# Patient Record
Sex: FEMALE | Race: WHITE | Employment: OTHER | URBAN - METROPOLITAN AREA
[De-identification: names, ages, dates, MRNs, and addresses within clinical notes are randomized per-mention and may not be internally consistent; named-entity substitution may affect disease eponyms.]

---

## 2017-01-20 ENCOUNTER — TELEPHONE (OUTPATIENT)
Dept: PULMONOLOGY | Facility: CLINIC | Age: 77
End: 2017-01-20

## 2017-01-20 RX ORDER — AZITHROMYCIN 250 MG/1
TABLET, FILM COATED ORAL
Qty: 6 TABLET | Refills: 0 | Status: SHIPPED | OUTPATIENT
Start: 2017-01-20 | End: 2017-09-29

## 2017-01-20 NOTE — TELEPHONE ENCOUNTER
Pt c/o nonproductive cough. She denies any other symptoms. Pt stts she would like to have a Z-Miguel on hand just in case. Explained I will d/w AR this afternoon & f/u. She verbalized understanding.

## 2017-01-20 NOTE — TELEPHONE ENCOUNTER
V/o received from Dr. Jeison Howard for [address]. Rx sent to pharmacy. Notified pt of Dr. Los Kulkarni orders. She verbalized understanding.

## 2017-01-23 RX ORDER — IPRATROPIUM BROMIDE AND ALBUTEROL SULFATE 2.5; .5 MG/3ML; MG/3ML
SOLUTION RESPIRATORY (INHALATION)
Qty: 360 ML | Refills: 3 | Status: SHIPPED | OUTPATIENT
Start: 2017-01-23 | End: 2017-09-29

## 2017-09-29 ENCOUNTER — OFFICE VISIT (OUTPATIENT)
Dept: PULMONOLOGY | Facility: CLINIC | Age: 77
End: 2017-09-29

## 2017-09-29 VITALS
SYSTOLIC BLOOD PRESSURE: 125 MMHG | OXYGEN SATURATION: 90 % | HEIGHT: 60 IN | HEART RATE: 81 BPM | DIASTOLIC BLOOD PRESSURE: 75 MMHG | WEIGHT: 211.38 LBS | BODY MASS INDEX: 41.5 KG/M2 | RESPIRATION RATE: 18 BRPM

## 2017-09-29 DIAGNOSIS — J96.11 CHRONIC RESPIRATORY FAILURE WITH HYPOXIA (HCC): ICD-10-CM

## 2017-09-29 DIAGNOSIS — J43.9 PULMONARY EMPHYSEMA, UNSPECIFIED EMPHYSEMA TYPE (HCC): Primary | ICD-10-CM

## 2017-09-29 PROCEDURE — G0008 ADMIN INFLUENZA VIRUS VAC: HCPCS | Performed by: INTERNAL MEDICINE

## 2017-09-29 PROCEDURE — 90653 IIV ADJUVANT VACCINE IM: CPT | Performed by: INTERNAL MEDICINE

## 2017-09-29 PROCEDURE — G0463 HOSPITAL OUTPT CLINIC VISIT: HCPCS | Performed by: INTERNAL MEDICINE

## 2017-09-29 PROCEDURE — 99214 OFFICE O/P EST MOD 30 MIN: CPT | Performed by: INTERNAL MEDICINE

## 2017-09-29 RX ORDER — AZITHROMYCIN 250 MG/1
TABLET, FILM COATED ORAL
Qty: 6 TABLET | Refills: 0 | Status: SHIPPED | OUTPATIENT
Start: 2017-09-29 | End: 2019-09-13

## 2017-09-29 RX ORDER — ALBUTEROL SULFATE 90 UG/1
2 AEROSOL, METERED RESPIRATORY (INHALATION) EVERY 6 HOURS PRN
Qty: 1 INHALER | Refills: 5 | Status: SHIPPED | OUTPATIENT
Start: 2017-09-29 | End: 2018-06-22

## 2017-09-29 RX ORDER — IPRATROPIUM BROMIDE AND ALBUTEROL SULFATE 2.5; .5 MG/3ML; MG/3ML
SOLUTION RESPIRATORY (INHALATION)
Qty: 360 ML | Refills: 3 | Status: SHIPPED | OUTPATIENT
Start: 2017-09-29 | End: 2017-10-02

## 2017-09-29 NOTE — PROGRESS NOTES
HPI:    Patient ID: Angelika Lewis is a 78year old female.     HPI  About baseline   Still smoking few cigarette /day   Was distressed last 2 months after her sister's death   Dyspnea on exertion   No cough now or sputum / no fever or chills and no c supple. No JVD present. Cardiovascular: Normal rate. Exam reveals no friction rub. No murmur heard.   Pulmonary/Chest:   Barrel shaped chest   Diminished bilaterally   Prolonged expiration   No wheezes or rales or rhonchi    Abdominal: Bowel sounds ar

## 2017-10-02 RX ORDER — IPRATROPIUM BROMIDE AND ALBUTEROL SULFATE 2.5; .5 MG/3ML; MG/3ML
SOLUTION RESPIRATORY (INHALATION)
Qty: 360 ML | Refills: 3 | Status: SHIPPED | OUTPATIENT
Start: 2017-10-02 | End: 2018-06-22

## 2017-10-02 NOTE — TELEPHONE ENCOUNTER
Pharmacy states RX needs diagnosis codes for Medicare:       Current Outpatient Prescriptions:   •  ipratropium-albuterol 0.5-2.5 (3) MG/3ML Inhalation Solution, USE 3 ML VIA NEBULIZER 4 TIMES A DAY AS NEEDED, Disp: 360 mL, Rfl: 3

## 2018-06-22 ENCOUNTER — TELEPHONE (OUTPATIENT)
Dept: PULMONOLOGY | Facility: CLINIC | Age: 78
End: 2018-06-22

## 2018-06-22 ENCOUNTER — OFFICE VISIT (OUTPATIENT)
Dept: PULMONOLOGY | Facility: CLINIC | Age: 78
End: 2018-06-22

## 2018-06-22 VITALS
HEIGHT: 60 IN | HEART RATE: 93 BPM | SYSTOLIC BLOOD PRESSURE: 148 MMHG | OXYGEN SATURATION: 90 % | WEIGHT: 209 LBS | RESPIRATION RATE: 18 BRPM | DIASTOLIC BLOOD PRESSURE: 83 MMHG | BODY MASS INDEX: 41.03 KG/M2

## 2018-06-22 DIAGNOSIS — J06.9 VIRAL UPPER RESPIRATORY TRACT INFECTION: ICD-10-CM

## 2018-06-22 DIAGNOSIS — J42 CHRONIC BRONCHITIS, UNSPECIFIED CHRONIC BRONCHITIS TYPE (HCC): Primary | ICD-10-CM

## 2018-06-22 PROCEDURE — 99214 OFFICE O/P EST MOD 30 MIN: CPT | Performed by: INTERNAL MEDICINE

## 2018-06-22 PROCEDURE — G0463 HOSPITAL OUTPT CLINIC VISIT: HCPCS | Performed by: INTERNAL MEDICINE

## 2018-06-22 RX ORDER — IPRATROPIUM BROMIDE AND ALBUTEROL SULFATE 2.5; .5 MG/3ML; MG/3ML
SOLUTION RESPIRATORY (INHALATION)
Qty: 360 ML | Refills: 3 | Status: SHIPPED | OUTPATIENT
Start: 2018-06-22 | End: 2019-09-24

## 2018-06-22 RX ORDER — AZITHROMYCIN 250 MG/1
TABLET, FILM COATED ORAL
Qty: 6 TABLET | Refills: 0 | Status: SHIPPED | OUTPATIENT
Start: 2018-06-22 | End: 2019-09-20 | Stop reason: ALTCHOICE

## 2018-06-22 RX ORDER — ALBUTEROL SULFATE 90 UG/1
2 AEROSOL, METERED RESPIRATORY (INHALATION) EVERY 6 HOURS PRN
Qty: 1 INHALER | Refills: 5 | Status: SHIPPED | OUTPATIENT
Start: 2018-06-22

## 2018-06-22 RX ORDER — PREDNISONE 10 MG/1
TABLET ORAL
Qty: 18 TABLET | Refills: 0 | Status: SHIPPED | OUTPATIENT
Start: 2018-06-22 | End: 2019-09-20 | Stop reason: ALTCHOICE

## 2018-06-22 NOTE — PROGRESS NOTES
HPI:    Patient ID: Yesenia Alvarado is a 78year old female. HPI  Hoarseness in her voice and URI symptoms / slight increase in cough   No f/c   No abd pain or n/v/d   No edema  Review of Systems   Constitutional: Positive for fatigue.  Negative for Musculoskeletal: She exhibits no edema or tenderness.               ASSESSMENT/PLAN:   Chronic bronchitis, unspecified chronic bronchitis type (hcc)  (primary encounter diagnosis)  Viral upper respiratory tract infection      1- severe copd    Mild acute

## 2018-06-22 NOTE — TELEPHONE ENCOUNTER
Pt seen in clinic today. Dr. Siri Bonner signed pt parking placard form. Per Pt request form has been mailed to [employer].

## 2019-01-01 ENCOUNTER — TELEPHONE (OUTPATIENT)
Dept: PULMONOLOGY | Facility: CLINIC | Age: 79
End: 2019-01-01

## 2019-09-13 ENCOUNTER — OFFICE VISIT (OUTPATIENT)
Dept: PULMONOLOGY | Facility: CLINIC | Age: 79
End: 2019-09-13
Payer: MEDICARE

## 2019-09-13 VITALS
TEMPERATURE: 99 F | WEIGHT: 205 LBS | HEART RATE: 93 BPM | HEIGHT: 60 IN | RESPIRATION RATE: 20 BRPM | SYSTOLIC BLOOD PRESSURE: 153 MMHG | OXYGEN SATURATION: 93 % | BODY MASS INDEX: 40.25 KG/M2 | DIASTOLIC BLOOD PRESSURE: 74 MMHG

## 2019-09-13 DIAGNOSIS — J44.9 CHRONIC OBSTRUCTIVE PULMONARY DISEASE, UNSPECIFIED COPD TYPE (HCC): Primary | ICD-10-CM

## 2019-09-13 DIAGNOSIS — J96.11 CHRONIC RESPIRATORY FAILURE WITH HYPOXIA (HCC): ICD-10-CM

## 2019-09-13 PROCEDURE — 99214 OFFICE O/P EST MOD 30 MIN: CPT | Performed by: INTERNAL MEDICINE

## 2019-09-13 PROCEDURE — G0463 HOSPITAL OUTPT CLINIC VISIT: HCPCS | Performed by: INTERNAL MEDICINE

## 2019-09-13 RX ORDER — AZITHROMYCIN 250 MG/1
TABLET, FILM COATED ORAL
Qty: 6 TABLET | Refills: 0 | Status: CANCELLED | OUTPATIENT
Start: 2019-09-13

## 2019-09-13 RX ORDER — AZITHROMYCIN 250 MG/1
TABLET, FILM COATED ORAL
Qty: 6 TABLET | Refills: 0 | Status: SHIPPED | OUTPATIENT
Start: 2019-09-13 | End: 2019-09-20 | Stop reason: ALTCHOICE

## 2019-09-13 NOTE — PROGRESS NOTES
HPI:    Patient ID: Dong Walker is a 70year old female.     HPI  More dyspnea on exertion / stressed out with her daughter diagnosed with cancer   No cough or sputum   No f/c   No cp   Stable weight   Review of Systems   Constitutional: Negative f to person, place, and time. She appears well-nourished. No distress. HENT:   Head: Atraumatic. Neck: Neck supple. No JVD present. Cardiovascular: Normal rate. Exam reveals no gallop. No murmur heard. Pulmonary/Chest: No stridor.  She has no wheezes

## 2019-09-20 PROBLEM — E66.01 MORBID OBESITY (HCC): Status: ACTIVE | Noted: 2019-09-20

## 2019-09-20 PROBLEM — I10 ESSENTIAL HYPERTENSION: Status: ACTIVE | Noted: 2019-09-20

## 2019-09-20 PROBLEM — R35.0 URINARY FREQUENCY: Status: ACTIVE | Noted: 2019-09-20

## 2019-09-20 PROBLEM — J43.9 PULMONARY EMPHYSEMA, UNSPECIFIED EMPHYSEMA TYPE (HCC): Status: ACTIVE | Noted: 2019-09-20

## 2019-09-20 PROCEDURE — 87086 URINE CULTURE/COLONY COUNT: CPT | Performed by: INTERNAL MEDICINE

## 2019-09-20 PROCEDURE — 81001 URINALYSIS AUTO W/SCOPE: CPT | Performed by: INTERNAL MEDICINE

## 2019-09-25 RX ORDER — IPRATROPIUM BROMIDE AND ALBUTEROL SULFATE 2.5; .5 MG/3ML; MG/3ML
SOLUTION RESPIRATORY (INHALATION)
Qty: 360 ML | Refills: 3 | Status: SHIPPED | OUTPATIENT
Start: 2019-09-25

## 2019-12-04 NOTE — TELEPHONE ENCOUNTER
Patient thinks Kandis is giving her UTI. Patient states she has had 3 since starting medication and has not cleared up. Please call. Thank you.

## 2019-12-04 NOTE — TELEPHONE ENCOUNTER
Pt wondering if Trelegy is causing UTI. Pt states she started Trelegy inhaler 4-5 months ago and shortly after she has been getting persistent UTIs. Pt has been treated with multiple antibiotics prescribed by PCP.  Pt c/o of urinary frequency, denies bleedi

## 2019-12-04 NOTE — TELEPHONE ENCOUNTER
I do not believe Trelegy will cause UTI  But if the patient not comfortable with a Trelegy and she thinks caused UTI am okay to stop  But again I am not aware about Trelegy causing urinary tract infection

## 2020-01-01 ENCOUNTER — TELEPHONE (OUTPATIENT)
Dept: PULMONOLOGY | Facility: CLINIC | Age: 80
End: 2020-01-01

## 2020-07-03 NOTE — TELEPHONE ENCOUNTER
Spoke with patient regarding message below. Patient states she currently uses 3L of O2 and is wondering if O2 can be increased to 4L if needed during physical therapy. Patient denies shortness of breath, chest pain, wheezing, cough.  Dr. Garfield Wang- Please advi

## 2020-07-06 NOTE — TELEPHONE ENCOUNTER
Spoke with patient. Patient informed of Dr. Machelle Pastrana message below. Patient verbalized understanding.

## 2020-09-29 NOTE — TELEPHONE ENCOUNTER
Sent call to Mala[facility] states patient is having issues with COPD, also patient has had a fall. Wanted to know if patient should be sent to ER. Please call. Thank you.

## 2020-09-29 NOTE — TELEPHONE ENCOUNTER
Spoke with daughter Kenia Mathis (LETICIA on file) regarding message below. Daughter states patient fell for the second time. Daughter states patient's ankles and knees are swollen, sciatica pain and breathing are worse.  Daughter states patient is not able to get u

## 2020-09-29 NOTE — TELEPHONE ENCOUNTER
Spoke with daughter. Informed daughter of Dr. Baez Cable message below. Daughter states ambulance took patient to [facility] and will have patient transferred to [facility].  Daughter states patients \"oxygen saturation levels are bad, has fluid in lungs and a urinar